# Patient Record
Sex: MALE | Race: WHITE | NOT HISPANIC OR LATINO | Employment: OTHER | URBAN - METROPOLITAN AREA
[De-identification: names, ages, dates, MRNs, and addresses within clinical notes are randomized per-mention and may not be internally consistent; named-entity substitution may affect disease eponyms.]

---

## 2023-05-26 ENCOUNTER — HOSPITAL ENCOUNTER (OUTPATIENT)
Dept: DATA CONVERSION | Facility: HOSPITAL | Age: 73
End: 2023-05-26
Attending: OTOLARYNGOLOGY | Admitting: OTOLARYNGOLOGY
Payer: COMMERCIAL

## 2023-05-26 DIAGNOSIS — Z79.82 LONG TERM (CURRENT) USE OF ASPIRIN: ICD-10-CM

## 2023-05-26 DIAGNOSIS — I10 ESSENTIAL (PRIMARY) HYPERTENSION: ICD-10-CM

## 2023-05-26 DIAGNOSIS — H90.3 SENSORINEURAL HEARING LOSS, BILATERAL: ICD-10-CM

## 2023-05-26 DIAGNOSIS — E78.5 HYPERLIPIDEMIA, UNSPECIFIED: ICD-10-CM

## 2023-05-26 DIAGNOSIS — Z79.02 LONG TERM (CURRENT) USE OF ANTITHROMBOTICS/ANTIPLATELETS: ICD-10-CM

## 2023-09-29 NOTE — PROGRESS NOTES
"History Of Present Illness:  Hardik Villeda is a 73 y.o. male presenting with a history of bilateral SNHL and right-sided tinnitus, he is s/p right-sided cochlear implant on 5/25/23. He has a bitter taste on the right side of his mouth since surgery, it has noticeably improved, but it's still present. He is able to hear with his cochlear implant, he can hear his own voice well, but other voices are \"high pitched and echoey\". He is wearing his implant about half the day, he's been working on watching television with it on. He finds he's been working harder to actually hear.     Recall 1/24/23:   Hardik is a 72 year old male referred here today by Dr. Jordan Bella for cochlear implant evaluation. Patient reports a progressive bilateral hearing loss, R>L. He has a tough time particularly with female voices, does okay with lower frequencies. Has difficulty hearing in crowded settings. Currently wears bilateral BTE hearings aids. Does report trauma to the right ear(Whiffle ball) 10 yrs ago, when he first started to notice his hearing loss. Remote history of ear infections as a child but never had tubes placed and no history of ear surgery. Denies any exposure to ototoxic medications. Denies any significant loud noise exposure. Does require MRIs for the C-Spine and pancreatic cyst that he gets an MRI for every 2 years for monitoring. Endorses bilateral tinnitus, R>L. Denies otalgia, otorrhea, vertigo, aural fullness. Never had a MRI IAC.      Allergies:  Patient has no allergy information on record.    Review of Systems:  A comprehensive 10-point review of systems was obtained including constitutional, neurological, HEENT, pulmonary, cardiovascular, genito-urinary, and other pertinent systems and was negative except as noted in the HPI.     Physical Exam:  Constitutional   General appearance: Healthy-appearing, well-nourished, well groomed, in no acute distress.   Ability to communicate: Normal communication without aids, " "normal voice quality.       Head and face: Atraumatic with no masses, lesions, or scarring.   Facial strength: Normal strength and symmetry, no synkinesis or facial tic.     Ears  Otoscopic examination:   Right: Cochlear implant in place, magnet site clean dry and intact without evidence of skin breakdown     Nose: Dorsum symmetric with no visible or palpable deformities.     Oral Cavity/Mouth  Lips, teeth, and gums: Normal lips, gums, and dentition.     Oropharynx: Mucosa moist, no lesions.     Neck: Symmetrical, trachea midline. No masses visible.     Neurological/Psychiatric  Cranial Nerve Examination: II - XII grossly intact.  Orientation to person, place, and time: Normal.  Mood and affect: Normal.     Skin: Normal without rashes or lesions.     Pulmonary  Respiratory effort: Chest expands symmetrically.     Cardiovascular: Good peripheral pulses  Peripheral vascular system: No varicosities, carotid pulse normal, no edema. No jugular venous distension.     Extremities: Appearance of extremities: Normal. Gait normal.       Last Recorded Vitals:  There were no vitals taken for this visit.     Assessment/Plan :  73 y.o. male presenting with a history of bilateral SNHL and right-sided tinnitus, he is s/p right-sided cochlear implant on 5/25/23. He does have a mild bitter taste on the right side of his mouth since surgery that's gradually improving. He is able to hear with his cochlear implant, he can hear his own voices, but others are \"high pitched and echoey\".     - Advised to work on listening to spoken sound, referred to speech therapy for further exercises   - RTC in 1 year     Scribe Attestation:  By signing my name below, I, Ena Neal , Elham   attest that this documentation has been prepared under the direction and in the presence of Antonette Cristobal MD.      I have reviewed the documentation as scribed by Ena Neal and agree with the notes.   Antonette Cristobal MD\    "

## 2023-09-30 NOTE — H&P
History of Present Illness:   History Present Illness:  Reason for surgery: bilateral SNHL   HPI:    Patient is a 73y old male with bilateral SNHL (R>L) who is interested in a right CI.     Past Medical History:        Medical History:   Sensorineural hearing loss (SNHL):    History of kidney stones:    Hyperlipidemia:    Hypertension:        Surg History:   History of colonoscopy with polypectomy:    History of tonsillectomy:    History of cervical spinal surgery:     Allergies:        Allergies:  ·  No Known Allergies :     Home Medication Review:   Home Medications Reviewed: yes     Impression/Procedure:   ·  Impression and Planned Procedure: right cochlear implant       ERAS (Enhanced Recovery After Surgery):  ·  ERAS Patient: no       Physical Exam by System:    Respiratory/Thorax: NLB   Cardiovascular: Extremities WWP     Consent:   COVID-19 Consent:  ·  COVID-19 Risk Consent Surgeon has reviewed key risks related to the risk of leidy COVID-19 and if they contract COVID-19 what the risks are.     Attestation:   Note Completion:  I am a:  Resident/Fellow   Attending Attestation I saw and evaluated the patient.  I personally obtained the key and critical portions of the history and physical exam or was physically present for key and  critical portions performed by the resident/fellow. I reviewed the resident/fellow?s documentation and discussed the patient with the resident/fellow.  I agree with the resident/fellow?s medical decision making as documented in the note.     I personally evaluated the patient on 26-May-2023         Electronic Signatures:  Bhanu Espinal (Resident))  (Signed 26-May-2023 06:56)   Authored: History of Present Illness, Past Medical History,  Allergies, Home Medication Review, Impression/Procedure, ERAS, Physical Exam, Consent, Note Completion  Antonette Cristobal)  (Signed 26-May-2023 10:59)   Authored: Note Completion   Co-Signer: History of Present Illness, Past Medical  History, Allergies, Home Medication Review, Impression/Procedure, ERAS, Physical Exam,  Consent, Note Completion      Last Updated: 26-May-2023 10:59 by Antonette Cristobal)

## 2023-10-02 PROBLEM — R41.841 COGNITIVE COMMUNICATION DEFICIT: Status: ACTIVE | Noted: 2023-10-02

## 2023-10-02 PROBLEM — R48.8 OTHER SYMBOLIC DYSFUNCTIONS: Status: ACTIVE | Noted: 2023-10-02

## 2023-10-02 PROBLEM — H90.3 SENSORINEURAL HEARING LOSS, BILATERAL: Status: ACTIVE | Noted: 2023-10-02

## 2023-10-02 PROBLEM — H93.11 TINNITUS, SUBJECTIVE, RIGHT: Status: ACTIVE | Noted: 2023-10-02

## 2023-10-02 RX ORDER — LOSARTAN POTASSIUM 50 MG/1
50 TABLET ORAL
COMMUNITY
Start: 2023-04-04

## 2023-10-02 RX ORDER — ATORVASTATIN CALCIUM 10 MG/1
10 TABLET, FILM COATED ORAL
COMMUNITY
Start: 2023-04-04

## 2023-10-02 RX ORDER — OXYCODONE AND ACETAMINOPHEN 5; 325 MG/1; MG/1
1 TABLET ORAL
COMMUNITY
Start: 2022-11-28

## 2023-10-02 RX ORDER — AMLODIPINE BESYLATE 5 MG/1
5 TABLET ORAL
COMMUNITY
Start: 2023-04-04

## 2023-10-02 NOTE — OP NOTE
Post Operative Note:     PreOp Diagnosis: Bilateral Sensorineural hearing  loss   Post-Procedure Diagnosis: Same   Procedure: 1. Right cochlear implant   2. Right facial nerve monitoring  3. Right microsurgical techniques requiring the use of a microscope   Surgeon: Dr. Cristobal   Resident/Fellow/Other Assistant: Bhanu Espinal MD   Anesthesia: General   Estimated Blood Loss (mL): 5cc   Specimen: no   Findings: please see op note   Patient Returned To/Condition: PACU, stable, dispo  home   Implants: Cochlear Nucleus 632 electrode     Operative Report Dictated:  Dictation: not applicable - note contains Operative  Report   Operative Report:      Operative Indications:   Patient is a 73y old male with with a history of  bilateral sensorineural hearing loss which is worse on the right. He does has residual low frequency hearing based on hismost recent audiogram. . Patient was counseled extensively regarding possible auditory  rehabilitative options. Cochlear implant evaluation was completed. Patient was determined to be an appropriate audiometric candidate. Patient's temporal bone CT scan was obtained and did not demonstrate any anomalies. He was counseled regarding the expectations,  motivation, and benefits, risks discussed included, but not limited to, bleeding, infection, device failure, facial paralysis, dizziness, taste disturbance, other unusual complications may occur as well. After this discussion he provided written informed  consent to proceed.    Findings:  1. Standard mastoidectomy performed and facial recess opened, round window visualized without issue  2.  Insertion of  implant from Nucleus, full insertion achieved  3. Electrical conduction studies with audiology at end of case confirmed appropriate stimulation of the implant  4. Placement check with no tip foldover      Procedure in Detail:   The patient was seen in preop. Consent was obtained as described above. Correct site was marked.  Patient was then taken back to the operative suite and laid supine on the operating table. Pre-operative huddle was performed by Dr. Cristobal. Following induction  of anesthesia, patient was intubated without any difficulty. Perioperative antibiotics were administered. The table was turned 180 degrees. Patient was supported to surgical bed with tapes and belts. The electrodes of the facial nerve monitoring system  were inserted percutaneously in the corresponding position and adequate functioning of the circuit was confirmed. Hair was clipped from his postauricular area. The #### ear was clearly exposed.  A pre-incision timeout was performed by Dr. Cristobal. The postauricular  region was injected with a mixture of 1% lidocaine and 1:100,000 epinephrine. The skin was prepped and draped.     A postauricular incision situated about 2 mm behind the postauricular sulcus measuring about 6cm was made using a 15 blade. An anteriorly based flap just superficial to the post-auricular muscle was raised. A posterior flap was also raised at a similar  level. Then a muscular pericranial flap was outlined and elevated exposing the pars squamosa, the temporal bone and the mastoid.     Next, using a combination of cutting and amber burs, the mastoidectomy was completed. The facial recess was opened with a small amber bur. Chorda tympani and facial nerve were identified and preserved, and the round window was visualized. A 27g needle  was used to izaguirre the round window membrane. Irrigation was performed as well as hemostasis.    A subperiosteal pocket was created postero-superiorly to place the internal  stimulator. This was done by using blunt dissection, and the size was confirmed using the medium metal template. The internal  stimulator was placed in the pocket.  Another pocket was created anteriorly to house the extra temporal electrode. Following this, the implant was opened, the internal  stimulator was  placed in the pocket. The active electrode array was introduced into the round window and full insertion  was achieved. No CSF pressure was seen. A muscle plug was inserted to cover the insertion site of the electrode into the round window. The ground electrode was placed in the extra temporal pocket.     The muscular flap was then closed over the mastoidectomy cavity and cochlear implant electrodes using interrupted 3-0 vicryl sutures. Dermal closure was done using buried interrupted 3-0 Vicryl, and skin was closed with 5-0 fast.  Impedances and neural  response telemetries were checked and were adequate. No tip foldover was noted on the placement check.     Large mastoid dressing was applied. All needle and sponge counts were correct. The table was turned back towards the anesthesia team, and the patient was awakened and transferred to the PACU in stable condition.    Dr. Cristobal was present and participated in all critical portions of the procedure.    Attestation:   Note Completion:  I am a: Resident/Fellow   Attending Attestation I was present for key portions of the procedure and the procedure lasted longer than 5 minutes.          Electronic Signatures:  Bhanu Espianl (Resident))  (Signed 26-May-2023 07:04)   Authored: Post Operative Note, Note Completion  Antonette Cristobal)  (Signed 26-May-2023 11:43)   Authored: Post Operative Note, Note Completion      Last Updated: 26-May-2023 11:43 by Antonette Cristobal)

## 2023-10-04 ENCOUNTER — OFFICE VISIT (OUTPATIENT)
Dept: OTOLARYNGOLOGY | Facility: CLINIC | Age: 73
End: 2023-10-04
Payer: MEDICARE

## 2023-10-04 VITALS — WEIGHT: 217.1 LBS | HEIGHT: 71 IN | TEMPERATURE: 97.6 F | BODY MASS INDEX: 30.39 KG/M2

## 2023-10-04 DIAGNOSIS — Z96.21 COCHLEAR IMPLANT STATUS: ICD-10-CM

## 2023-10-04 DIAGNOSIS — H90.3 SENSORINEURAL HEARING LOSS, BILATERAL: Primary | ICD-10-CM

## 2023-10-04 DIAGNOSIS — H93.11 TINNITUS, SUBJECTIVE, RIGHT: ICD-10-CM

## 2023-10-04 PROCEDURE — 1160F RVW MEDS BY RX/DR IN RCRD: CPT | Performed by: OTOLARYNGOLOGY

## 2023-10-04 PROCEDURE — 99213 OFFICE O/P EST LOW 20 MIN: CPT | Performed by: OTOLARYNGOLOGY

## 2023-10-04 PROCEDURE — 1159F MED LIST DOCD IN RCRD: CPT | Performed by: OTOLARYNGOLOGY

## 2023-10-04 PROCEDURE — 1036F TOBACCO NON-USER: CPT | Performed by: OTOLARYNGOLOGY

## 2023-10-04 RX ORDER — ASPIRIN 81 MG/1
81 TABLET ORAL DAILY
COMMUNITY

## 2023-10-04 ASSESSMENT — PATIENT HEALTH QUESTIONNAIRE - PHQ9
1. LITTLE INTEREST OR PLEASURE IN DOING THINGS: NOT AT ALL
SUM OF ALL RESPONSES TO PHQ9 QUESTIONS 1 AND 2: 0
2. FEELING DOWN, DEPRESSED OR HOPELESS: NOT AT ALL

## 2023-10-04 NOTE — PATIENT INSTRUCTIONS
Welcome to Dr. Cristobal's clinic. We are here to assist you through your ENT care at [facility].  Dr. Cristobal is an Ear surgeon. This means that she specializes in taking care of patients with complex ear problems.     Dr. Cristobal's office number is [phone]. While you may see her at a satellite office, she has a team committed to help meet your healthcare needs at [facility]. This number is the most direct way to communicate with the office.     Kristen is Dr. Cristobal's  and she answers the office phone from 8am-4pm Mon-Fri. She can help you with many general questions and information. Questions that she cannot answer will be directed to the appropriate staff. You may need to leave a message. In this case, someone from the team will call you back.     Desmond is Dr. Cristobal's primary nurse and can be reached by calling the office. Desmond is in clinic with Dr. Cristobal's on Mondays and Tuesdays. Non-urgent calls will be returned on non-clinic days typically Thursdays.     Sometimes, other team members will also be involved in your care. These people may include dieticians, social workers, speech therapists, audiologist, neurologist, and physical therapist. Dr. Cristobal will provide these referrals as needed. Please let her know if you would like to request a specific referral.     For your convenience, Dr. Cristobal sees patients at several [facility] locations including [facility] and [facility] at the main campus of [facility]. While we try to make your appointments as convenient as possible, occasionally a visit to another location may be necessary to provide the best care for you. We look forward to working with you to meet your healthcare goals.     Dr. Cristobal makes every effort to run on time for your appointments. Therefore, if you are more than 30 minutes late unrelated to a scan or another appointment such therapy or audio you will have to reschedule.

## 2023-10-27 ENCOUNTER — CLINICAL SUPPORT (OUTPATIENT)
Dept: AUDIOLOGY | Facility: CLINIC | Age: 73
End: 2023-10-27
Payer: MEDICARE

## 2023-10-27 DIAGNOSIS — H90.3 SENSORINEURAL HEARING LOSS (SNHL) OF BOTH EARS: Primary | ICD-10-CM

## 2023-10-27 PROCEDURE — 92604 REPROGRAM COCHLEAR IMPLT 7/>: CPT | Performed by: AUDIOLOGIST

## 2023-10-27 PROCEDURE — 92626 EVAL AUD FUNCJ 1ST HOUR: CPT | Mod: 59 | Performed by: AUDIOLOGIST

## 2023-10-27 NOTE — PROGRESS NOTES
COCHLEAR IMPLANT PROGRAMMING and FUNCTIONAL TESTING      Name:  Hardik Villeda  :  1950  Age:  73 y.o.  Date of Evaluation:  10/27/2023     RIGHT DEVICE  External Device: Cochlear [id number] (N8) sound processor  Internal Device: Cochlear [employer]   Surgeon: Antonette Cristobal MD  Implantation Date: 23  Activation Date: 2023    HISTORY  Hardik Villeda arrives today for programming and optimization of the Cochlear Americas  cochlear implantation in the right cochlea used in conjunction with a Cochlear [id number] (N8) sound processor. The patient  arrives in Program 1. Today, he reports satisfaction with his cochlear implant. 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     PROGRAMMING  Headset pressure, magnet strength (5I), and incision site were checked with no problems noted. Datalogging revealed 6+ hours of use per day with 2+ hours in speech, on average since the last appointment.    Electrode impedances, used to monitor internal device function, were measured across the electrode array.  Impedance measures were within normal limits for all electrodes, in each stimulation mode. Programming began from MAP 10.  Current parameters of MP1+2 stimulation mode, an ACE speech processing strategy, 8 maxima, and a 900 Hz stimulation rate were maintained.  Threshold (T) levels were measuring using counting method on a variety of channels with the remaining channels interpolated. Comfort (C) levels were measuring using up-down bracketing method on a variety of channels with the remaining channels interpolated.  C levels were swept for loudness balancing. No facial stimulation or non-auditory stimulation observed during live speech. Current programming consists of:  Program 1 - MAP 11 - SCAN  enabled WNR/NR  Standard/Focus/Adaptive Omero  V: 6  S: 10  Program 2 - MAP 11 - ADRO + AutoS  enabled WNR/NR  Standard Omero  V: 6  S: 10  Program 3 - MAP 11 - ADRO + AutoS   enabled WNR/NR  Focus Omero  V: 6  S: 8  Program 4 - MAP 11 - ADRO + AutoS  enabled WNR/NR  Adaptive Omero  V: 6  S: 8    FUNCTIONAL TESTING  Testing prior to programming at user settings (P1/V6/S12)    All testing was completed in the soundfield at 0 degrees azimuth. Pure tone testing was obtained using pulsed frequency modulating (FM) stimuli, and SRT was obtained using monitored live voice (MLV). Sentence and word recognition testing was performed with recorded material at 60 dBSPL.    C = right Cochlear [id number] (N8) sound processor   Aided thresholds obtained 15-35 dBHL for 250-6000 Hz.  SRT = 40 dB HL    LISTENING CONDITION CNC Words  AzBio Sentences in Quiet  AzBio Sentences in +10 SNR 4-talker babble (S0N0)    Right CI only 56% 74% 12%   Bimodal DNT DNT 60%     The Cochlear Implant Quality of Life-35 Profile (CIQOL-35 Profile) is a patient-reported outcome measure that was developed to assess the functional ability of adult cochlear users in 6 domains: Communication, Emotional, Entertainment, Environmental, Listening effort, and Social. The patient's CIQOL-10 global raw score is 34 and converted score is 51.99.     IMPRESSIONS  Positive stimulation on all active electrodes.  A reliable psychophysical MAP was defined in the ACE speech processing strategy. Testing revealed stable recognition and detection since last evaluation (7/6/2023) and significant improvement in recognition when compared to pre-operative scores.     RECOMMENDATIONS  1. Continue medical follow-up with your neuro-otologist (Dr. Cristobal, Dr. Browne, Dr. Burgess or Dr. Breen)  2. Utilize the Cochlear [employer] Nucleus Cochlear [id number] (N8) sound processor daily using the most tolerated program.  3. Return annually for programming, optimization, and functional testing.   4. Utilize aural rehabilitation/auditory training programs, books on tape, and written materials at home to improve speech understanding ability.  5. Communication strategies  were discussed (utilizing visual cues/gestures; reducing background noise and distance from desired source; increasing light to assist with visual cues; use of clear speech).  6. Contact [facility] CI Team auditory-verbal therapist to initiate aural rehabilitation therapy.        Dipika Loaiza, CCC-A  Senior Clinical Audiologist    [facility]  [facility]    TIME: 13:00-14:30

## 2024-01-26 ENCOUNTER — CLINICAL SUPPORT (OUTPATIENT)
Dept: AUDIOLOGY | Facility: CLINIC | Age: 74
End: 2024-01-26
Payer: MEDICARE

## 2024-01-26 DIAGNOSIS — H90.3 SENSORINEURAL HEARING LOSS (SNHL) OF BOTH EARS: Primary | ICD-10-CM

## 2024-01-26 PROCEDURE — 92604 REPROGRAM COCHLEAR IMPLT 7/>: CPT | Performed by: AUDIOLOGIST

## 2024-01-26 NOTE — PROGRESS NOTES
COCHLEAR IMPLANT PROGRAMMING    Name:  Hardik Villeda  :  1950  Age:  73 y.o.  Date of Evaluation:  2024     RIGHT DEVICE  External Device: Cochlear [id number] (N8) sound processor  Internal Device: Cochlear [employer]   Surgeon: Antonette Cristobal MD  Implantation Date: 23  Activation Date: 2023    HISTORY  Hardik Villeda arrives today for programming and optimization of the Cochlear Americas  cochlear implantation in the right cochlea used in conjunction with a Cochlear [id number] (N8) sound processor. The patient arrives in Program 2. Today, he reports satisfaction with his cochlear implant. 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     PROGRAMMING  Headset pressure, magnet strength (5I), and incision site were checked with no problems noted. Datalogging revealed 8+ hours of use per day with 1+ hours in speech, on average since the last appointment.    Electrode impedances, used to monitor internal device function, were measured across the electrode array.  Impedance measures were within normal limits for all electrodes, in each stimulation mode. Programming began from MAP 11.  Channels 1 and 2 were disabled at a previous appointment and remain disabled today. Current parameters of MP1+2 stimulation mode, an ACE speech processing strategy, 8 maxima, and a 900 Hz stimulation rate were maintained.  Threshold (T) levels were measuring using up-down bracketing on a variety of channels with the remaining channels interpolated. Comfort (C) levels were measuring using up-down bracketing method on a variety of channels with the remaining channels interpolated.  C levels were swept for loudness balancing. No facial stimulation or non-auditory stimulation observed during live speech. Current programming consists of:  Program 1 - MAP 12 - SCAN  enabled WNR/NR  Standard/Focus/Adaptive Omero  V: 6  S: 10  Program 2 - MAP 12 - ADRO + AutoS  enabled WNR/NR   Standard Omero  V: 6  S: 10  Program 3 - MAP 12 - ADRO + AutoS  enabled WNR/NR  Focus Omero  V: 6  S: 8  Program 4 - MAP 12 - ADRO + AutoS  enabled WNR/NR  Adaptive Omero  V: 6  S: 8    FUNCTIONAL TESTING  Not tested today.    IMPRESSIONS  Positive stimulation on all active electrodes.  A reliable psychophysical MAP was defined in the ACE speech processing strategy. Discussed pairing the minimic and TV streamer, ordering a Resound Nexia hearing aid for bimodal streaming, and trying the hybrid/acoustic  at the next appointment. We will complete unaided testing, aided testing, and CIQOL at [place] (annual) appointment. He will check his insurance benefit to see if he has hearing aid benefits and a location he can obtain the hearing aid.    RECOMMENDATIONS  1. Continue medical follow-up with your neuro-otologist (Dr. Cristobal, Dr. Browne, Dr. Burgess or Dr. Breen)  2. Utilize the Cochlear [employer] Nucleus Cochlear FS1325 (N8) sound processor daily using the most tolerated program.  3. Return annually (June 2024) for programming, optimization, and functional testing.   4. Utilize aural rehabilitation/auditory training programs, books on tape, and written materials at home to improve speech understanding ability.  5. Communication strategies were discussed (utilizing visual cues/gestures; reducing background noise and distance from desired source; increasing light to assist with visual cues; use of clear speech).  6. Contact [facility] CI Team auditory-verbal therapist to initiate aural rehabilitation therapy.    Dipika Loaiza, CCC-A  Senior Clinical Audiologist    TIME: 13:

## 2024-03-28 ENCOUNTER — CLINICAL SUPPORT (OUTPATIENT)
Dept: AUDIOLOGY | Facility: CLINIC | Age: 74
End: 2024-03-28
Payer: MEDICARE

## 2024-03-28 DIAGNOSIS — H90.3 SENSORINEURAL HEARING LOSS (SNHL) OF BOTH EARS: Primary | ICD-10-CM

## 2024-03-28 PROCEDURE — 92604 REPROGRAM COCHLEAR IMPLT 7/>: CPT | Performed by: AUDIOLOGIST

## 2024-03-28 NOTE — PROGRESS NOTES
HEARING AID (HA) CONSULTATION       Name:  Hardik Villeda  :  1950  Age:  74 y.o.  Date of Evaluation:  3/28/2024     RIGHT DEVICE  External Device: Cochlear [id number] (N8) sound processor  Internal Device: Cochlear Vacuneks   Surgeon: Antonette Cristobal MD  Implantation Date: 23  Activation Date: 2023    HISTORY  Patient arrived today for hearing aid fitting consultation due to bilateral hearing loss, CI in the right ear. Interested in a bimodal hearing aid.  Reports right CI is very loud compared to his hearing aid.     EVALUATION  Otoscopy revealed clear ear canals and tympanic membrane visualized, bilaterally.    See audiologic evaluation from 10/2023.    PROGRAMMING  Headset pressure, magnet strength (5I), and incision site were checked with no problems noted. Datalogging revealed 8+ hours of use per day with 1+ hours in speech, on average since the last appointment.     Electrode impedances, used to monitor internal device function, were measured across the electrode array.  Impedance measures were within normal limits for all electrodes, in each stimulation mode. Programming began from MAP 11.  Channels 1 and 2 were disabled at a previous appointment and remain disabled today. Current parameters of MP1+2 stimulation mode, an ACE speech processing strategy, 8 maxima, and a 900 Hz stimulation rate were maintained.  Added hybrid option, size 2 100 power . Acoustic <1225 Hz and Electric >438 Hz. Using pop mean procedures, measured C and T levels (MAP 14). Decreased gain on electric only program (MAP 13). No facial stimulation or non-auditory stimulation observed during live speech. Current programming consists of:  Program 1 - MAP 14 (hybrid) - SCAN  enabled WNR/NR  Standard/Focus/Adaptive Omero  V: 6  S: 10  Program 2 - MAP 13 - SCAN  enabled WNR/NR  Standard/Focus/Adaptive Omero  V: 6  S: 10    IMPRESSIONS:  Today's 1-hour appointment was spent discussing various amplification options.  Discussed different styles of amplification.  Discussed at length the various hearing aid technologies.  Discussed the benefits of monaural vs. binaural amplification (i.e. hearing in background noise/wireless technology/localization cues).  Hearing aid limitations were discussed at length as well as realistic expectations.  The patient was advised in order to receive full benefit of amplification, consistent use during all waking hours is recommended.  Hearing aid(s) are not a cure for hearing loss. Following the discussion, the patient will order:    1 left Resound Nexia 9 DRWC in silver with a 2 M  and tulip domes.    The patient paid $1840.00 today for amplification and dispensing fee - bill for hearing aid at fitting.    RECOMMENDATIONS:  Continue medical follow-up with physician.  Return for audiologic assessment in conjunction with otologic care or annually.   Return for hearing aid fitting in 2-3 weeks. Try P1 (hybrid) vs P2 (electric only) to see which provide a better sound quality. Return for CI programming in June, 2024.    PATIENT EDUCATION:   Discussed results and recommendations with Hardik Villeda.  Questions were addressed and the patient was encouraged to contact our department ([phone]) should concerns arise.    HYUN Samson, CCC-A  Senior Clinical Audiologist    TIME: [phone]

## 2024-05-01 NOTE — PROGRESS NOTES
AUDIOLOGY HEARING AID FITTING      Name:  Hardik Villeda   :  1950   Age:  74 y.o.   Date of Evaluation:  2024     Time: 9901-5225    HISTORY     Patient arrived today for hearing aid fitting. Most recent audiologic evaluation performed on 10/27/2023 revealed mild sensorineural hearing loss through 750 Hz sloping to moderately-severe through 8000 Hz in the left ear.       DEVICES     RIGHT DEVICE  External Device: Cochlear [id number] (N8) sound processor coupled to Power 100 dB  with  large power dome.   Internal Device: Cochlear [employer]Rus   Surgeon: Antonette Cristobal MD  Implantation Date: 23  Activation Date: 2023    LEFT DEVICE  Resound Nexia 9 DRWC [id number] (SN: [id number]) with a 2 M P  coupled to a tulip dome  Wax Traps:   : Standard , RS Nexia 60, US SN [id number]  Repair Warranty: 2027  Loss and Damage Warranty: 2027  Fitting Date: 2024   Fitting Audiologist: HYUN Wilson CCC-A     Paired to Phone for phone calls: Yes  Paired to smart phone application: No  Fitting formula: NAL-NL2  Gain Level: 100%  Volume controls active: Yes    Programs:  All-Around   Hear in Noise     IMPRESSIONS     Today's 1-hour hearing aid fitting appointment was spent discussing use, care, and maintenance of the hearing devices. The patient was able to properly insert and remove the hearing instrument from the ear. Low-battery and volume control signals were demonstrated for the patient. In addition to today's verbal instruction of the hearing devices, the patient was given written instructions from the hearing aid .  Hearing aid limitations were discussed at length as well as realistic expectations. The patient was advised in order to receive full benefit of amplification, consistent use during all waking hours is recommended. The repair warranty (coverage/cost from the hearing aid  and not the responsibility of [facility]) and the  "conditions of the right-to-return period (30-days).     Paired cochlear implant and Resound hearing aid in Custom Sound software. Paired cochlear implant and Resound hearing aid to patient's iPhone through Accessibility - Hearing Devices menu. Demonstrated volume control for both devices through Control Center with \"ear\" icon.    Patient reported that the left reciever coupled to his cochlear implant comes out of his ear throughout the day and he feels it is not a good fit. Changed from medium power dome to large power dome. Fit looked good and patient reported better retention and good comfort.    Patient paid $1840.00 ($1440.00 for hearing aid, and $400.00 fitting fee) in full at time of hearing aid evaluation.     RECOMMENDATIONS     Strive for full-time device use during waking hours, except when activities preclude device safety.  Return for hearing aid fitting follow-up appointment scheduled on Tuesday, May 21 at 2:00 with Dipika Wilson CCC-A  Return for cochlear implant programming in June of 2024.   Continue medical follow-up with neuro otologist at least annually.  Consider use of good communication strategies. These include but are not limited to the following: get Hardik's attention before speaking to him, close the distance between Hardik and who is speaking, limit background noise, allow Hardik access to visual cues (i.e. facial expressions/mouth movements, pictures, written instructions, etc.). When in situations where background noise cannot be avoided, position yourself so that the background noise is to your back, and you communication partner is seated in front of you, ideally with a quiet area or wall behind them.     PATIENT EDUCATION     Discussed results and recommendations with Mr. Villeda. Questions were addressed and the patient was encouraged to contact our department at [phone] should concerns arise.     DIPIKA Wilson, REHAN-A  Pediatric Clinical Audiologist     "

## 2024-05-06 ENCOUNTER — CLINICAL SUPPORT (OUTPATIENT)
Dept: AUDIOLOGY | Facility: CLINIC | Age: 74
End: 2024-05-06

## 2024-05-06 DIAGNOSIS — H90.3 SENSORINEURAL HEARING LOSS (SNHL) OF BOTH EARS: Primary | ICD-10-CM

## 2024-05-06 PROCEDURE — V5257 HEARING AID, DIGIT, MON, BTE: HCPCS | Mod: AUDSP

## 2024-05-06 PROCEDURE — V5241 DISPENSING FEE, MONAURAL: HCPCS | Mod: AUDSP | Performed by: AUDIOLOGIST

## 2024-05-21 ENCOUNTER — APPOINTMENT (OUTPATIENT)
Dept: AUDIOLOGY | Facility: CLINIC | Age: 74
End: 2024-05-21

## 2024-06-28 ENCOUNTER — APPOINTMENT (OUTPATIENT)
Dept: AUDIOLOGY | Facility: CLINIC | Age: 74
End: 2024-06-28
Payer: MEDICARE

## 2024-07-22 ENCOUNTER — CLINICAL SUPPORT (OUTPATIENT)
Dept: AUDIOLOGY | Facility: CLINIC | Age: 74
End: 2024-07-22
Payer: MEDICARE

## 2024-07-22 DIAGNOSIS — H93.11 TINNITUS, SUBJECTIVE, RIGHT: ICD-10-CM

## 2024-07-22 DIAGNOSIS — H90.3 SENSORINEURAL HEARING LOSS, BILATERAL: Primary | ICD-10-CM

## 2024-07-22 NOTE — PROGRESS NOTES
ADULT HEARING AID CHECK      Name:  Hardik Villeda   :  1950   Age:  74 y.o.   Date of Evaluation:  2024     Time: 2748-8833    RECOMMENDATIONS     Strive for full-time device use during waking hours, except when activities preclude device safety.  Reschedule annual cochlear implant programming in 2024 as July appointment was cancelled.   Continue medical follow-up with neuro otologist at least annually.  Consider use of good communication strategies. These include but are not limited to the following: get Hardik's attention before speaking to him, close the distance between Hardik and who is speaking, limit background noise, allow Hardik access to visual cues (i.e. facial expressions/mouth movements, pictures, written instructions, etc.). When in situations where background noise cannot be avoided, position yourself so that the background noise is to your back, and you communication partner is seated in front of you, ideally with a quiet area or wall behind them.    HISTORY     Mr. Villeda arrived for a hearing aid check. Today, he reports overall doing well with his devices; however, endorses the following tinny sound quality with the right cochlear implant, and unsure of benefit of left hearing aid as the right ear seems louder than the left.     Mr. Villeda's most recent audiologic evaluation performed on 10/27/2023 revealed mild sensorineural hearing loss through 750 Hz sloping to moderately-severe through 8000 Hz in the left ear.    Device Information     RIGHT DEVICE  External Device: Cochlear [id number] (N8) sound processor coupled to Power 100 dB  with  large power dome.   Internal Device: Cochlear Gutenbergzs   Surgeon: Antonette Cristobal MD  Implantation Date: 23  Activation Date: 2023    LEFT DEVICE  Resound Nexia 9 [facility] [id number] (SN: [id number]) with a 2 M P  coupled to a tulip dome  Wax Traps:   : Standard , RS Nexia 60, US SN [id number]  Repair Warranty:  05/03/2027  Loss and Damage Warranty: 05/03/2027  Fitting Date: 5/6/2024   Fitting Audiologist: Mindi Sinha, HYUN, CCC-A     Paired to Phone for phone calls: Yes  Paired to smart phone application: No  Fitting formula: NAL-NL2  Gain Level: 100%  Volume controls active: Yes    Programs:  All-Around   Hear in Noise     PROGRAMMING, VERIFICATION, AND VALIDATION MEASURES     Otoscopy revealed clear ear canals with visible cones of light bilaterally.      Mr. Villeda's left hearing aid was cleaned and checked. Visual inspection and listening check revealed good working function and good sound quality of left hearing aid. Changed wax traps and domes. The hearing aids were paired to the computer software. The following programming changes were made: left hearing aid was adjusted to meet real ear measurement (REM) targets, and then overall volume was decreased by 4 steps at patient request. The left hearing aid was returned to patient who expressed satisfaction.     Measurements to account for unique size and shape of Mr. Vargass ears in hearing aid programming included: real ear measures. Adjustments were made based on measurement of soft (55 dB SPL), average (65 dB SPL), and loud (75 dB dB SPL) speech, as well as maximum permissible output (MPO), to ensure that Mr. Villeda is being provided with the most appropriate amplification. The pre-verification SII  for average speech was 23 and the post-verification SII was 42 for the left ear. These results indicate Mr. Villeda will have increased access to the sounds of speech when using his left hearing aid.       Left SII    Pre Verification Post Verification   Soft   (55 dB SPL) 14 28   Average   (65 dB SPL) 23 42   Loud   (75 dB SPL) 36 71   MPO Adequate     PROGRAMMING  Headset pressure, magnet strength (5I), and incision site were checked with no problems noted. Datalogging revealed 6+ hours of use per day with 1+ hours in speech, on average since the last appointment. 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 Usage data showed majority use of program 2 (MAP 13 Electric only); however, patient reported more frequent use of program 1 (MAP 14 hybrid).     Changed dome and wax trap of acoustic component. Provided patient with extra domes.     Electrode impedances, used to monitor internal device function, were measured across the electrode array.  Impedance measures were within normal limits for all electrodes, in each stimulation mode. Programming began from MAP 14. Channels 1 and 2 were disabled at a previous appointment and remain disabled today. Current parameters of MP1+2 stimulation mode, an ACE speech processing strategy, 8 maxima, and a 900 Hz stimulation rate were maintained. C levels were swept for loudness balancing. No facial stimulation or non-auditory stimulation observed during live speech. Patient requested programs for different environments that he had before his March appointment. Current programming consists of:  Program 1 - MAP 14 (Hybrid) - SCAN  enabled WNR/NR  Standard/Focus/Adaptive Omero  V: 6  S: 10  Program 2 (Home) - MAP 14 (Hybrid) + AutoS  enabled WNR/NR  Standard Omero  V: 6  S: 10  Program 3 (Restaurant) - MAP 14 (Hybrid) - ADRO + AutoS  enabled WNR/NR  Focus Omero  V: 6  S: 8  Program 4 (Groups) - MAP 14 (Hybrid) - ADRO + AutoS  enabled WNR/NR  Adaptive Omero  V: 6  S: 8    No charge, within first 90 days of hearing aid fitting.     PATIENT EDUCATION     Discussed results and recommendations with Mr. Villeda. Questions were addressed and the patient was encouraged to contact our department at [phone] should concerns arise.       HYUN Wilson, CCC-A  Licensed Clinical Audiologist

## 2024-09-25 ENCOUNTER — APPOINTMENT (OUTPATIENT)
Dept: AUDIOLOGY | Facility: CLINIC | Age: 74
End: 2024-09-25
Payer: COMMERCIAL

## 2024-10-15 ENCOUNTER — APPOINTMENT (OUTPATIENT)
Dept: OTOLARYNGOLOGY | Facility: CLINIC | Age: 74
End: 2024-10-15
Payer: COMMERCIAL

## 2024-10-24 ENCOUNTER — APPOINTMENT (OUTPATIENT)
Dept: AUDIOLOGY | Facility: CLINIC | Age: 74
End: 2024-10-24
Payer: MEDICARE

## 2024-10-24 DIAGNOSIS — H90.3 SENSORINEURAL HEARING LOSS (SNHL) OF BOTH EARS: Primary | ICD-10-CM

## 2024-10-24 PROCEDURE — 92567 TYMPANOMETRY: CPT | Performed by: AUDIOLOGIST

## 2024-10-24 PROCEDURE — 92604 REPROGRAM COCHLEAR IMPLT 7/>: CPT | Performed by: AUDIOLOGIST

## 2024-10-24 PROCEDURE — 92626 EVAL AUD FUNCJ 1ST HOUR: CPT | Performed by: AUDIOLOGIST

## 2024-10-24 NOTE — PROGRESS NOTES
COCHLEAR IMPLANT PROGRAMMING and FUNCTIONAL TESTING      Name:  Hardik Villeda  :  1950  Age:  74 y.o.  Date of Evaluation:  10/24/2024     RIGHT DEVICE  External Device: Cochlear [id number] (N8) sound processor  Internal Device: Cochlear [employer]   Surgeon: Antonette Cristobal MD  Implantation Date: 23  Activation Date: 2023    HISTORY  Hardik Villeda arrives today for programming and optimization of the Cochlear Americas  cochlear implantation in the right cochlea used in conjunction with a Cochlear [id number] (N8) sound processor. The patient  arrives in Program 4. Today, he reports satisfaction with his cochlear implant. 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     PROGRAMMING  Headset pressure, magnet strength (5I), and incision site were checked with no problems noted. Datalogging revealed 6+ hours of use per day with 2+ hours in speech, on average since the last appointment.     Electrode impedances, used to monitor internal device function, were measured across the electrode array.  Impedance measures were within normal limits for all electrodes, in each stimulation mode. Programming began from MAP 14.  Disabled channels 1 and 2 at previous appointment and remains disabled today. Current parameters of MP1+2 stimulation mode, an ACE speech processing strategy, 8 maxima, increased PW to 37 today. and a 900 Hz stimulation rate were maintained.  Threshold (T) levels were measuring using counting method on a variety of channels with the remaining channels interpolated. Comfort (C) levels were measuring using up-down bracketing method on a variety of channels with the remaining channels interpolated.  C levels were swept for loudness balancing. No facial stimulation or non-auditory stimulation observed during live speech. Current programming consists of:  Program 1 - MAP 15 - SCAN  enabled WNR/NR  Standard/Focus/Adaptive Omero  V: 6  S: 10  Program 2 -  MAP 15 - ADRO + AutoS  enabled WNR/NR  Standard Omero  V: 6  S: 10  Program 3 - MAP 15 - ADRO + AutoS  enabled WNR/NR  Focus Omero  V: 6  S: 8  Program 4 - MAP 15 - ADRO + AutoS  enabled WNR/NR  Adaptive Omero  V: 6  S: 8    FUNCTIONAL TESTING  Testing prior to programming at user settings (P1/V8/S12)    All testing was completed in the soundfield at 0 degrees azimuth. Pure tone testing was obtained using pulsed frequency modulating (FM) stimuli. Sentence and word recognition testing was performed with recorded material at 60 dBSPL.    LISTENING CONDITION Aided thresholds 250-6000 Hz CNC Words  AzBio Sentences in Quiet  AzBio Sentences in +10 SNR 4-talker babble (S0N0)    Right CI only; left plugged 20-50 dB HL 48% 78% 27%   Bimodal DNT 76% DNT 71%     The Cochlear Implant Quality of Life-35 Profile (CIQOL-35 Profile) is a patient-reported outcome measure that was developed to assess the functional ability of adult cochlear users in 6 domains: Communication, Emotional, Entertainment, Environmental, Listening effort, and Social. The patient's CIQOL-10 global raw score is 35 and converted score is 53.45.     UNAIDED TESTING (puretone audiometry only 125-8000 Hz)  RIGHT: Moderately severe to profound mixed hearing loss.  LEFT: Mild to severe sensorineural hearing loss.    IMPRESSIONS  Positive stimulation on all active electrodes.  A reliable psychophysical MAP was defined in the ACE speech processing strategy in the EAS condition. Testing revealed stable recognition and detection since last evaluation (10/2023) and significant improvement in recognition when compared to pre-operative scores. Improved hearing in the bimodal listening condition, significant benefit from left hearing aid. Unaided hearing is stable, bilaterally.     Encouraged patient to change the microphone cover at home.    RECOMMENDATIONS  1. Continue medical follow-up with your neuro-otologist (Dr. Cristobal, Dr. Browne, Dr. Burgess or   Nuha)  2. Utilize the Cochlear [employer] Nucleus Cochlear [id number] (N8) sound processor daily using the most tolerated program.  3. Return annually for programming, optimization, and functional testing.   4. Utilize aural rehabilitation/auditory training programs, books on tape, and written materials at home to improve speech understanding ability.  5. Communication strategies were discussed (utilizing visual cues/gestures; reducing background noise and distance from desired source; increasing light to assist with visual cues; use of clear speech).  6. Contact [facility] CI Team auditory-verbal therapist to initiate aural rehabilitation therapy.    Dipika Loaiza, CCC-A  Senior Clinical Audiologist    TIME: 13:00-14:30

## 2024-11-04 NOTE — PROGRESS NOTES
"History Of Present Illness:  Hardik Villeda is a 74 y.o. male with a history of bilateral SNHL and right-sided tinnitus, he is s/p right-sided cochlear implant on 5/25/23. He presents for routine follow-up today. He is overall doing well and happy with his implant. 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 On average, he uses it about 6 hours a day, but he says he will use it more once the colder weather starts. His left hearing aid continues to function well for him. No pain or concerns around the magnet site. His CI battery seems to maybe not be lasting as long as it used to.    Recall 10/4/23:  Hardik Villeda is a 73 y.o. male presenting with a history of bilateral SNHL and right-sided tinnitus, he is s/p right-sided cochlear implant on 5/25/23. He has a bitter taste on the right side of his mouth since surgery, it has noticeably improved, but it's still present. He is able to hear with his cochlear implant, he can hear his own voice well, but other voices are \"high pitched and echoey\". He is wearing his implant about half the day, he's been working on watching television with it on. He finds he's been working harder to actually hear.     Surgical History:  He has no past surgical history on file.     Allergies:  Patient has no known allergies.    Medications:   Current Outpatient Medications   Medication Instructions    amLODIPine (NORVASC) 5 mg    aspirin 81 mg, Daily    atorvastatin (LIPITOR) 10 mg    losartan (COZAAR) 50 mg    oxyCODONE-acetaminophen (Percocet) 5-325 mg tablet 1 tablet, oral      Review of Systems:   A comprehensive 10-point review of systems was obtained including constitutional, neurological, HEENT, pulmonary, cardiovascular, genito-urinary, and other pertinent systems and was negative except as noted in the HPI.      Physical Exam:  Constitutional   General appearance: " "Healthy-appearing, well-nourished, well groomed, in no acute distress.   Ability to communicate: Normal communication with CI and left hearing aid, normal voice quality.       Head and face: Atraumatic with no masses, lesions, or scarring.   Facial strength: Normal strength and symmetry, no synkinesis or facial tic.     Ears  Otoscopic examination: Bilateral normal otoscopy of the tympanic membrane. Skin over right CI magnet is intact and without erythema.     Nose: Dorsum symmetric with no visible or palpable deformities.     Oral Cavity/Mouth  Lips, teeth, and gums: Normal lips, gums, and dentition.     Oropharynx: Mucosa moist, no lesions.     Neck: Symmetrical, trachea midline. No masses visible.     Neurological/Psychiatric  Cranial Nerve Examination: II - XII grossly intact.  Orientation to person, place, and time: Normal.  Mood and affect: Normal.     Skin: Normal without rashes or lesions.     Pulmonary  Respiratory effort: Chest expands symmetrically.     Cardiovascular: Good peripheral pulses  Peripheral vascular system: No varicosities, carotid pulse normal, no edema. No jugular venous distension.     Extremities: Appearance of extremities: Normal. Gait normal.     Procedure:  None    Last Recorded Vitals:  Height 1.816 m (5' 11.5\"), weight 96.6 kg (213 lb).    Relevant Results:  I personally reviewed his CI performance evaluation from 10/24/24, which showed R CI CNC 48%, AzBioQ 78%, AzBio+10SNR  27%. Bimodal testing shows CNC 76%, AzBio+10SNR 71%.     Assessment/Plan   74 y.o. male with a history of bilateral SNHL and right-sided tinnitus, he is s/p right-sided cochlear implant on 5/25/23. He is doing well overall. He was encouraged to wear his processor as much as possible when he is not working at the [employer] course. He can follow up with [facility] regarding his concerns with the CI battery life. He also expressed concerns with sinus issues; he lives close to [place] and will schedule a visit with one of " their providers at his convenience. He will return in 1 year, sooner if issues or concerns.    Desmond Esparza MD  Neurotology Fellow           I saw and evaluated the patient. I personally obtained the key and critical portions of the history and physical exam or was physically present for key and critical portions performed by the resident/fellow. I reviewed the resident/fellow's documentation and discussed the patient with the resident/fellow. I agree with the resident/fellow's medical decision making as documented in the note.    Antonette Cristobal MD

## 2024-11-05 ENCOUNTER — APPOINTMENT (OUTPATIENT)
Dept: OTOLARYNGOLOGY | Facility: CLINIC | Age: 74
End: 2024-11-05
Payer: COMMERCIAL

## 2024-11-05 VITALS — HEIGHT: 72 IN | BODY MASS INDEX: 28.85 KG/M2 | WEIGHT: 213 LBS

## 2024-11-05 DIAGNOSIS — Z96.21 COCHLEAR IMPLANT STATUS: ICD-10-CM

## 2024-11-05 DIAGNOSIS — H93.11 TINNITUS, SUBJECTIVE, RIGHT: ICD-10-CM

## 2024-11-05 DIAGNOSIS — H90.3 SENSORINEURAL HEARING LOSS, BILATERAL: Primary | ICD-10-CM

## 2024-11-05 PROCEDURE — G2211 COMPLEX E/M VISIT ADD ON: HCPCS | Performed by: OTOLARYNGOLOGY

## 2024-11-05 PROCEDURE — 99212 OFFICE O/P EST SF 10 MIN: CPT | Performed by: OTOLARYNGOLOGY

## 2024-11-05 PROCEDURE — 1160F RVW MEDS BY RX/DR IN RCRD: CPT | Performed by: OTOLARYNGOLOGY

## 2024-11-05 PROCEDURE — 3008F BODY MASS INDEX DOCD: CPT | Performed by: OTOLARYNGOLOGY

## 2024-11-05 PROCEDURE — 1159F MED LIST DOCD IN RCRD: CPT | Performed by: OTOLARYNGOLOGY

## 2024-11-05 ASSESSMENT — PATIENT HEALTH QUESTIONNAIRE - PHQ9
2. FEELING DOWN, DEPRESSED OR HOPELESS: NOT AT ALL
SUM OF ALL RESPONSES TO PHQ9 QUESTIONS 1 AND 2: 0
1. LITTLE INTEREST OR PLEASURE IN DOING THINGS: NOT AT ALL

## 2024-11-05 NOTE — LETTER
"November 6, 2024     Jordan Bella MD  [facility]  [address] [address]    Patient: Hardik Villeda   YOB: 1950   Date of Visit: 11/5/2024       Dear Dr. Jordan Bella MD:    Thank you for referring Hardik Villeda to me for evaluation. Below are my notes for this consultation.  If you have questions, please do not hesitate to call me. I look forward to following your patient along with you.       Sincerely,     Antonette Cristobal MD      CC: HYUN Samson, CCC-A  ______________________________________________________________________________________    History Of Present Illness:  Hardik Villeda is a 74 y.o. male with a history of bilateral SNHL and right-sided tinnitus, he is s/p right-sided cochlear implant on 5/25/23. He presents for routine follow-up today. He is overall doing well and happy with his implant. 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 On average, he uses it about 6 hours a day, but he says he will use it more once the colder weather starts. His left hearing aid continues to function well for him. No pain or concerns around the magnet site. His CI battery seems to maybe not be lasting as long as it used to.    Recall 10/4/23:  Hardik Villeda is a 73 y.o. male presenting with a history of bilateral SNHL and right-sided tinnitus, he is s/p right-sided cochlear implant on 5/25/23. He has a bitter taste on the right side of his mouth since surgery, it has noticeably improved, but it's still present. He is able to hear with his cochlear implant, he can hear his own voice well, but other voices are \"high pitched and echoey\". He is wearing his implant about half the day, he's been working on watching television with it on. He finds he's been working harder to actually hear.     Surgical History:  He has no past surgical history on file.   " "  Allergies:  Patient has no known allergies.    Medications:   Current Outpatient Medications   Medication Instructions   • amLODIPine (NORVASC) 5 mg   • aspirin 81 mg, Daily   • atorvastatin (LIPITOR) 10 mg   • losartan (COZAAR) 50 mg   • oxyCODONE-acetaminophen (Percocet) 5-325 mg tablet 1 tablet, oral      Review of Systems:   A comprehensive 10-point review of systems was obtained including constitutional, neurological, HEENT, pulmonary, cardiovascular, genito-urinary, and other pertinent systems and was negative except as noted in the HPI.      Physical Exam:  Constitutional   General appearance: Healthy-appearing, well-nourished, well groomed, in no acute distress.   Ability to communicate: Normal communication with CI and left hearing aid, normal voice quality.       Head and face: Atraumatic with no masses, lesions, or scarring.   Facial strength: Normal strength and symmetry, no synkinesis or facial tic.     Ears  Otoscopic examination: Bilateral normal otoscopy of the tympanic membrane. Skin over right CI magnet is intact and without erythema.     Nose: Dorsum symmetric with no visible or palpable deformities.     Oral Cavity/Mouth  Lips, teeth, and gums: Normal lips, gums, and dentition.     Oropharynx: Mucosa moist, no lesions.     Neck: Symmetrical, trachea midline. No masses visible.     Neurological/Psychiatric  Cranial Nerve Examination: II - XII grossly intact.  Orientation to person, place, and time: Normal.  Mood and affect: Normal.     Skin: Normal without rashes or lesions.     Pulmonary  Respiratory effort: Chest expands symmetrically.     Cardiovascular: Good peripheral pulses  Peripheral vascular system: No varicosities, carotid pulse normal, no edema. No jugular venous distension.     Extremities: Appearance of extremities: Normal. Gait normal.     Procedure:  None    Last Recorded Vitals:  Height 1.816 m (5' 11.5\"), weight 96.6 kg (213 lb).    Relevant Results:  I personally reviewed his " CI performance evaluation from 10/24/24, which showed R CI CNC 48%, AzBioQ 78%, AzBio+10SNR  27%. Bimodal testing shows CNC 76%, AzBio+10SNR 71%.     Assessment/Plan  74 y.o. male with a history of bilateral SNHL and right-sided tinnitus, he is s/p right-sided cochlear implant on 5/25/23. He is doing well overall. He was encouraged to wear his processor as much as possible when he is not working at the [employer]. He can follow up with [facility] regarding his concerns with the CI battery life. He also expressed concerns with sinus issues; he lives close to [place] and will schedule a visit with one of their providers at his convenience. He will return in 1 year, sooner if issues or concerns.    Desmond Esparza MD  Neurotology Fellow           I saw and evaluated the patient. I personally obtained the key and critical portions of the history and physical exam or was physically present for key and critical portions performed by the resident/fellow. I reviewed the resident/fellow's documentation and discussed the patient with the resident/fellow. I agree with the resident/fellow's medical decision making as documented in the note.    Antonette Cristobal MD

## 2025-11-04 ENCOUNTER — APPOINTMENT (OUTPATIENT)
Dept: OTOLARYNGOLOGY | Facility: CLINIC | Age: 75
End: 2025-11-04
Payer: COMMERCIAL